# Patient Record
Sex: MALE | HISPANIC OR LATINO | URBAN - METROPOLITAN AREA
[De-identification: names, ages, dates, MRNs, and addresses within clinical notes are randomized per-mention and may not be internally consistent; named-entity substitution may affect disease eponyms.]

---

## 2020-03-12 NOTE — PATIENT DISCUSSION
RECOMMEND RLE OVER LASIK 2' STEEP K'S/HYPEROPIA/NSC OU. PT'S NEIGHBOR HAD RLE &amp; IS VERY INTERESTED IN RLE SX. TRIAL FRAMED MV IN OFFICE (OD NEAR, OS DISTANCE). MAY BE INTERESTED IN MV VS. [employer]. WALKED TO COUNSELOR TO DISC SCHEDULING.

## 2021-01-18 ENCOUNTER — IMPORTED ENCOUNTER (OUTPATIENT)
Dept: URBAN - METROPOLITAN AREA CLINIC 31 | Facility: CLINIC | Age: 56
End: 2021-01-18

## 2021-01-18 PROBLEM — H25.813: Noted: 2021-01-18

## 2021-01-18 PROBLEM — H11.001: Noted: 2021-01-18

## 2021-01-18 PROCEDURE — 92004 COMPRE OPH EXAM NEW PT 1/>: CPT

## 2021-01-18 PROCEDURE — 92083 EXTENDED VISUAL FIELD XM: CPT

## 2021-01-18 NOTE — PATIENT DISCUSSION
Disability exam:1. Combined Types of Cataract OU: Explained how cataracts can effect vision. Recommend clinical observation. The patient was advised to contact us if any change or worsening of vision. 2. Pterygium OD --Discussed that pterygium is caused by the cumulative effect of sun exposure over the patient's life. UV protection with the use of sunglasses and/or hat is important to prevent progression. Artificial tears prn. Monitor for possible worsening. 3. Hx of brain surgery 4 years ago4. Severe constriction of HVF OU but normal CVF and normal eye exam. Slight decreased in BCVA OS. Etiology unclear.

## 2021-01-18 NOTE — PATIENT DISCUSSION
Pterygium OD --Discussed that pterygium is caused by the cumulative effect of sun exposure over the patient's life. UV protection with the use of sunglasses and/or hat is important to prevent progression. Artificial tears prn. Monitor for possible worsening.

## 2022-01-31 NOTE — PATIENT DISCUSSION
The risks, benefits and alternatives of Refractive Lens Exchange were discussed with the patient. Risks including but not limited to: Infection, retinal detachment, lens dislocation, inflammation, loss of vision, retina swelling, increased pressure and need for further surgery. We discussed all the lens options including monofocal lens, toric lens, multifocal lens, astigmatism correction and other options. The patient understands that they may need glasses for optimal vision with any option. It was discussed that RLE is not covered by insurance and is an elective surgery.

## 2022-02-17 NOTE — PATIENT DISCUSSION
Toric IOL was discussed as needed for full correction of astigmatism. Discussed positioning and rotational stability, along with possibility of requiring rotation of the lens. not applicable

## 2022-02-17 NOTE — PATIENT DISCUSSION
Discussion of Risks/Benefits/Alternatives for RLE (2nd eye)--Discussed that the patient's first eye has reached stability and maximal medical benefit. Patient is still functionally impaired and wishes to proceed with surgery on the second eye for visual rehabilitation. The risks for the second eye surgery are the same as for the first eye surgery. Discussed the risks and benefits and alternatives of cataract surgery with the patient. Discussed options, such as leaving the cataract alone and / or trying new glasses. Patient made aware that new glasses will not make a significant improvement in function. Risks including loss of vision, need for more surgery, need for follow up, and slow recovery were discussed. There is a small chance that the vision will not improve and even smaller chance that for a variety of reasons the vision may worsen. Glasses might be necessary for reading or distance vision after surgery.

## 2022-03-09 NOTE — PATIENT DISCUSSION
Ed. patient. Likely allergy to OMNI drop. Patient instructed to d/c OMNI drop. Take an oral OTC antihistamine.  Rx Pred forte 1% BID x 1 week, QD x 1 week. Recommend PF art tears.

## 2022-03-09 NOTE — PATIENT DISCUSSION
Patient reassurance given that the implants are new and it takes time for the brain to adjust but we will watch her closely for improvement. Return as scheduled for 1 mo .

## 2022-03-11 NOTE — PATIENT DISCUSSION
OCT WNL. Patient reassurance given that the implants are new and it takes time for the brain to adjust but we will watch her closely for improvement. Return as scheduled for 1 mo .

## 2022-03-11 NOTE — PATIENT DISCUSSION
Ed. patient. Reassurance given. Patient had interaction between oral antihistamine and bactrim vs. allergy to bactrim.  Continue Pred 1% bid x 2 days; qd x 2 days then d/c  Recommend PF art tears.

## 2022-03-18 NOTE — PATIENT DISCUSSION
h/o interaction between oral antihistamine and bactrim vs. allergy to bactrim.  Recommend PF art tears + Pataday QD OU. Monitor,.

## 2022-04-02 ASSESSMENT — VISUAL ACUITY
OD_CC: J5
OD_CC: 20/30+2
OS_CC: 20/50-2
OS_CC: J7

## 2022-04-02 ASSESSMENT — TONOMETRY
OS_IOP_MMHG: 21
OD_IOP_MMHG: 19

## 2022-04-04 NOTE — PATIENT DISCUSSION
h/o interaction between oral antihistamine and bactrim vs. allergy to bactrim.  h/o reaction to PF art tears + Pataday. Best treated with amniotic membrane and/or specialty dry eye treatment. Refer to Dr. Forrest Merrill for evaluation.